# Patient Record
Sex: MALE | Race: WHITE | Employment: FULL TIME | URBAN - METROPOLITAN AREA
[De-identification: names, ages, dates, MRNs, and addresses within clinical notes are randomized per-mention and may not be internally consistent; named-entity substitution may affect disease eponyms.]

---

## 2017-04-28 ENCOUNTER — OFFICE VISIT (OUTPATIENT)
Dept: NEUROLOGY | Age: 32
End: 2017-04-28

## 2017-04-28 VITALS
BODY MASS INDEX: 23.01 KG/M2 | HEART RATE: 60 BPM | OXYGEN SATURATION: 98 % | WEIGHT: 151.8 LBS | SYSTOLIC BLOOD PRESSURE: 104 MMHG | HEIGHT: 68 IN | DIASTOLIC BLOOD PRESSURE: 70 MMHG

## 2017-04-28 DIAGNOSIS — G60.0 CMT (CHARCOT-MARIE-TOOTH DISEASE): Primary | ICD-10-CM

## 2017-04-28 NOTE — MR AVS SNAPSHOT
Visit Information Date & Time Provider Department Dept. Phone Encounter #  
 4/28/2017  8:00 AM Eliza Jung MD [facility]rology [facility] at [facility] [phone] [id number] Your Appointments 4/27/2018  8:40 AM  
Follow Up with Eliza Jung MD  
[facility]) Appt Note: Follow up $0CP tdb 4/28/17  
 [address] [place] [address], 
[address] [facility], [address] [phone]  
[address] [facility], [address] [facility], [address] [address] Upcoming Health Maintenance Date Due DTaP/Tdap/Td series (1 - Tdap) 1/20/2005 INFLUENZA AGE 9 TO ADULT 8/1/2016 Allergies as of 4/28/2017  Review Complete On: 4/28/2017 By: Eliza Jung MD  
 Not on File Current Immunizations  Never Reviewed No immunizations on file. Not reviewed this visit You Were Diagnosed With   
  
 Codes Comments CMT (Charcot-Flower-Tooth disease)    -  Primary ICD-10-CM: G60.0 ICD-9-CM: 356.1 Vitals BP Pulse Height(growth percentile) Weight(growth percentile) SpO2 BMI  
 104/70 60 5' 8\" (1.727 m) 151 lb 12.8 oz (68.9 kg) 98% 23.08 kg/m2 Smoking Status Never Smoker BMI and BSA Data Body Mass Index Body Surface Area 23.08 kg/m 2 1.82 m 2 Preferred Pharmacy Pharmacy Name Phone [facility] #[id number] Martha Rodriguez, [address] [facility] 2 [phone] Your Updated Medication List  
  
   
This list is accurate as of: 4/28/17  8:58 AM.  Always use your most recent med list.  
  
  
  
  
 [employer] Take  by mouth. We Performed the Following RIRI COMPREHENSIVE PLUS PANEL [[id number] Custom] ANGIOTENSIN CONVERTING ENZYME [id number] CPT(R)] CBC WITH AUTOMATED DIFF [81508 CPT(R)] CRP, HIGH SENSITIVITY [78813 CPT(R)] HEMOGLOBIN A1C W/O EAG [04958 CPT(R)] METABOLIC PANEL, COMPREHENSIVE [01969 CPT(R)] METHYLMALONIC ACID [43044 CPT(R)] SED RATE (ESR) [id number] CPT(R)] SPEP AND ANDREAS, SERUM [[id number] Custom] TSH AND FREE T4 [93009 CPT(R)] VITAMIN B12 [id number] CPT(R)] Patient Instructions 1. Blood work today 2. Follow-up in 1 year A Healthy Lifestyle: Care Instructions Your Care Instructions A healthy lifestyle can help you feel good, stay at a healthy weight, and have plenty of energy for both work and play. A healthy lifestyle is something you can share with your whole family. A healthy lifestyle also can lower your risk for serious health problems, such as high blood pressure, heart disease, and diabetes. You can follow a few steps listed below to improve your health and the health of your family. Follow-up care is a key part of your treatment and safety. Be sure to make and go to all appointments, and call your doctor if you are having problems. Its also a good idea to know your test results and keep a list of the medicines you take. How can you care for yourself at home? · Do not eat too much sugar, fat, or fast foods. You can still have dessert and treats now and then. The goal is moderation. · Start small to improve your eating habits. Pay attention to portion sizes, drink less juice and soda pop, and eat more fruits and vegetables. ¨ Eat a healthy amount of food. A 3-ounce serving of meat, for example, is about the size of a deck of cards. Fill the rest of your plate with vegetables and whole grains. ¨ Limit the amount of soda and sports drinks you have every day. Drink more water when you are thirsty. ¨ Eat at least 5 servings of fruits and vegetables every day. It may seem like a lot, but it is not hard to reach this goal. A serving or helping is 1 piece of fruit, 1 cup of vegetables, or 2 cups of leafy, raw vegetables.  Have an apple or some carrot sticks as an afternoon snack instead of a candy bar. Try to have fruits and/or vegetables at every meal. 
· Make exercise part of your daily routine. You may want to start with simple activities, such as walking, bicycling, or slow swimming. Try to be active 30 to 60 minutes every day. You do not need to do all 30 to 60 minutes all at once. For example, you can exercise 3 times a day for 10 or 20 minutes. Moderate exercise is safe for most people, but it is always a good idea to talk to your doctor before starting an exercise program. 
· Keep moving. Teresa Petersony the lawn, work in the garden, or Enplug. Take the stairs instead of the elevator at work. · If you smoke, quit. People who smoke have an increased risk for heart attack, stroke, cancer, and other lung illnesses. Quitting is hard, but there are ways to boost your chance of quitting tobacco for good. ¨ Use nicotine gum, patches, or lozenges. ¨ Ask your doctor about stop-smoking programs and medicines. ¨ Keep trying. In addition to reducing your risk of diseases in the future, you will notice some benefits soon after you stop using tobacco. If you have shortness of breath or asthma symptoms, they will likely get better within a few weeks after you quit. · Limit how much alcohol you drink. Moderate amounts of alcohol (up to 2 drinks a day for men, 1 drink a day for women) are okay. But drinking too much can lead to liver problems, high blood pressure, and other health problems. Family health If you have a family, there are many things you can do together to improve your health. · Eat meals together as a family as often as possible. · Eat healthy foods. This includes fruits, vegetables, lean meats and dairy, and whole grains. · Include your family in your fitness plan. Most people think of activities such as jogging or tennis as the way to fitness, but there are many ways you and your family can be more active.  Anything that makes you breathe hard and gets your heart pumping is exercise. Here are some tips: 
¨ Walk to do errands or to take your child to school or the bus. ¨ Go for a family bike ride after dinner instead of watching TV. Where can you learn more? Go to http://tatyana-alem.info/. Enter C594 in the search box to learn more about \"A Healthy Lifestyle: Care Instructions. \" Current as of: July 26, 2016 Content Version: 11.2 © 2240-4987 [employer]. Care instructions adapted under license by [employer] (which disclaims liability or warranty for this information). If you have questions about a medical condition or this instruction, always ask your healthcare professional. [address] any warranty or liability for your use of this information. Patient Instructions History Introducing [facility]! Rossana Moreira introduces Cribspot patient portal. Now you can access parts of your medical record, email your doctor's office, and request medication refills online. 1. In your internet browser, go to [email or web address] 2. Click on the First Time User? Click Here link in the Sign In box. You will see the New Member Sign Up page. 3. Enter your Cribspot Access Code exactly as it appears below. You will not need to use this code after youve completed the sign-up process. If you do not sign up before the expiration date, you must request a new code. · Cribspot Access Code: [email or web address] Expires: 7/27/2017  8:03 AM 
 
4. Enter the last four digits of your Social Security Number (xxxx) and Date of Birth (mm/dd/yyyy) as indicated and click Submit. You will be taken to the next sign-up page. 5. Create a Cribspot ID. This will be your Cribspot login ID and cannot be changed, so think of one that is secure and easy to remember. 6. Create a Schoologyt password. You can change your password at any time. 7. Enter your Password Reset Question and Answer. This can be used at a later time if you forget your password. 8. Enter your e-mail address. You will receive e-mail notification when new information is available in [address] 9. Click Sign Up. You can now view and download portions of your medical record. 10. Click the Download Summary menu link to download a portable copy of your medical information. If you have questions, please visit the Frequently Asked Questions section of the Woldme website. Remember, Woldme is NOT to be used for urgent needs. For medical emergencies, dial 911. Now available from your iPhone and Android! Please provide this summary of care documentation to your next provider. If you have any questions after today's visit, please call [phone].

## 2017-04-28 NOTE — PATIENT INSTRUCTIONS
1.  Blood work today  2. Follow-up in 1 year      A Healthy Lifestyle: Care Instructions  Your Care Instructions  A healthy lifestyle can help you feel good, stay at a healthy weight, and have plenty of energy for both work and play. A healthy lifestyle is something you can share with your whole family. A healthy lifestyle also can lower your risk for serious health problems, such as high blood pressure, heart disease, and diabetes. You can follow a few steps listed below to improve your health and the health of your family. Follow-up care is a key part of your treatment and safety. Be sure to make and go to all appointments, and call your doctor if you are having problems. Its also a good idea to know your test results and keep a list of the medicines you take. How can you care for yourself at home? · Do not eat too much sugar, fat, or fast foods. You can still have dessert and treats now and then. The goal is moderation. · Start small to improve your eating habits. Pay attention to portion sizes, drink less juice and soda pop, and eat more fruits and vegetables. ¨ Eat a healthy amount of food. A 3-ounce serving of meat, for example, is about the size of a deck of cards. Fill the rest of your plate with vegetables and whole grains. ¨ Limit the amount of soda and sports drinks you have every day. Drink more water when you are thirsty. ¨ Eat at least 5 servings of fruits and vegetables every day. It may seem like a lot, but it is not hard to reach this goal. A serving or helping is 1 piece of fruit, 1 cup of vegetables, or 2 cups of leafy, raw vegetables. Have an apple or some carrot sticks as an afternoon snack instead of a candy bar. Try to have fruits and/or vegetables at every meal.  · Make exercise part of your daily routine. You may want to start with simple activities, such as walking, bicycling, or slow swimming. Try to be active 30 to 60 minutes every day.  You do not need to do all 30 to 60 minutes all at once. For example, you can exercise 3 times a day for 10 or 20 minutes. Moderate exercise is safe for most people, but it is always a good idea to talk to your doctor before starting an exercise program.  · Keep moving. Yadira Gold the lawn, work in the garden, or Connect[employer]ity. Take the stairs instead of the elevator at work. · If you smoke, quit. People who smoke have an increased risk for heart attack, stroke, cancer, and other lung illnesses. Quitting is hard, but there are ways to boost your chance of quitting tobacco for good. ¨ Use nicotine gum, patches, or lozenges. ¨ Ask your doctor about stop-smoking programs and medicines. ¨ Keep trying. In addition to reducing your risk of diseases in the future, you will notice some benefits soon after you stop using tobacco. If you have shortness of breath or asthma symptoms, they will likely get better within a few weeks after you quit. · Limit how much alcohol you drink. Moderate amounts of alcohol (up to 2 drinks a day for men, 1 drink a day for women) are okay. But drinking too much can lead to liver problems, high blood pressure, and other health problems. Family health  If you have a family, there are many things you can do together to improve your health. · Eat meals together as a family as often as possible. · Eat healthy foods. This includes fruits, vegetables, lean meats and dairy, and whole grains. · Include your family in your fitness plan. Most people think of activities such as jogging or tennis as the way to fitness, but there are many ways you and your family can be more active. Anything that makes you breathe hard and gets your heart pumping is exercise. Here are some tips:  ¨ Walk to do errands or to take your child to school or the bus. ¨ Go for a family bike ride after dinner instead of watching TV. Where can you learn more? Go to http://tatyana-alem.info/.   Enter Q495 in the search box to learn more about \"A Healthy Lifestyle: Care Instructions. \"  Current as of: July 26, 2016  Content Version: 11.2  © 7533-5493 Float: [place]. Care instructions adapted under license by [employer] (which disclaims liability or warranty for this information). If you have questions about a medical condition or this instruction, always ask your healthcare professional. [address] any warranty or liability for your use of this information.

## 2017-04-28 NOTE — PROGRESS NOTES
NEUROLOGY NEW PATIENT CONSULATION  REFERRED BY:  No primary care provider on file. 17    Chief Complaint   Patient presents with    New Patient     General Concerns with family /left foot weakness       HISTORY OF PRESENT ILLNESS  Kam Iniguez is a 34 y.o. male who presented to the neurology office for management of Charcot-Flower-Tooth. 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 He was always told that his walking was funny. He did have an EMG/nerve conduction study at that time. In  he did run a marathon and did have fracture of his left tibia and was in a boot for around 8 months. Since that time his left big toe does not have the strength. He cannot pick it up after workup. He denies any numbness or tingling. He has not had any falls in he is able to his daily activity. His symptoms are mild to moderate with no modifying factors. His father, grandmom and great grandfather had Charcot-Flower-Tooth disease as well. His grandmom is still alive and is [de-identified]years old. She uses a cane to walk. She does have problems with circulation in the legs and does not have any diabetes. His dad  at the age of 61 because of heart attack. Current Outpatient Prescriptions   Medication Sig    FINASTERIDE (PROPECIA PO) Take  by mouth. No current facility-administered medications for this visit. Not on File  Past Medical History:   Diagnosis Date    CMT (Charcot-Flower-Tooth disease)     Crohn disease ([facility] [address]     Developmental delay      History reviewed. No pertinent surgical history.   Family History   Problem Relation Age of Onset    Parkinsonism Paternal Uncle     Stroke Maternal Grandmother      Social History   Substance Use Topics    Smoking status: Never Smoker    Smokeless tobacco: None    Alcohol use None       REVIEW OF SYSTEMS:   A ten system review of constitutional, cardiovascular, respiratory, musculoskeletal, endocrine, skin, [address], genitourinary, psychiatric and neurologic systems was obtained and is unremarkable with the exception of muscle weakness     EXAMINATION:   Visit Vitals    /70    Pulse 60    Ht 5' 8\" (1.727 m)    Wt 151 lb 12.8 oz (68.9 kg)    SpO2 98%    BMI 23.08 kg/m2        General:   General appearance: Pt is in no acute distress   Distal pulses are preserved  Fundoscopic Exam: Normal    Neurological Examination:   Mental Status: AAO x3. Speech is fluent. Follows commands, has normal fund of knowledge, attention, short term recall, comprehension and insight. Cranial Nerves: Visual fields are full. PERRL, Extraocular movements are full. Facial sensation intact V1- V3. Facial movement intact, symmetric. Hearing intact to conversation. Palate elevates symmetrically. Shoulder shrug symmetric. Tongue midline. Motor: Strength 2 out of 5 in dorsiflexion of the feet and 0 out of 5 on big toe extension bilaterally. He does have atrophy of calf muscles bilaterally in his feet    Tone: Normal    Sensation: Decreased pinprick sensation distally in the upper and lower extremities    Reflexes: DTRs absent throughout. Plantar responses downgoing. Coordination/Cerebellar: Intact to finger-nose-finger     Gait: High steppage gait bilaterally    Skin: No significant bruising or lacerations. Laboratory review:   No results found for this or any previous visit. Imaging review:  None    Documentation review:  None    Assessment/Plan:   Cady Al is a 34 y.o. male who presented to the neurology office for management of Charcot-Flower-Tooth disease. He was diagnosed with this at the age of 10. He does have numbness distally in his upper and lower extremities on examination along with weakness in bilateral foot dorsiflexion. On walking he does have a high steppage gait. Overall he is doing pretty well. I did discuss with him extensively about Charcot-Flower-Tooth disease and the gradual progression.   I did tell him to avoid alcohol and will be doing blood work to check if there is any underlying factors that might be promoting his nerve damage. Follow-up in 1 year. Over 45 minutes was spent with the patient and family of which > 50% of the visit was spent counseling on diagnosis, management, and treatment of the diagnosis          ICD-10-CM ICD-9-CM    1. CMT (Charcot-Flower-Tooth disease) G60.0 356.1 VITAMIN B12      TSH AND FREE T4      SPEP AND ANDREAS, SERUM      SED RATE (ESR)      METHYLMALONIC ACID      HEMOGLOBIN A1C W/O EAG      CRP, HIGH SENSITIVITY      METABOLIC PANEL, COMPREHENSIVE      CBC WITH AUTOMATED DIFF      RIRI COMPREHENSIVE PLUS PANEL      ANGIOTENSIN CONVERTING ENZYME      Thank you for allowing me to participate in the care of Mr. Deloris Bobby. Please feel free to contact me if you have any questions. Efrain Byrne MD  Diplomate, American Board of Psychiatry & Neurology (Neurology)  Daija Abdi Board of Psychiatry & Neurology (Clinical Neurophysiology)    CC: No primary care provider on file. Fax: None    This note was created using voice recognition software. Despite editing, there may be syntax errors. This note will not be viewable in [address]

## 2019-12-11 ENCOUNTER — HOSPITAL ENCOUNTER (OUTPATIENT)
Dept: ULTRASOUND IMAGING | Age: 34
Discharge: HOME | End: 2019-12-11
Attending: INTERNAL MEDICINE
Payer: COMMERCIAL

## 2019-12-11 DIAGNOSIS — K50.80 CROHN'S DISEASE OF SMALL AND LARGE INTESTINES (HCC): ICD-10-CM

## 2019-12-11 PROCEDURE — 76700 US EXAM ABDOM COMPLETE: CPT

## 2023-05-23 ENCOUNTER — TELEPHONE (OUTPATIENT)
Facility: HOSPITAL | Age: 38
End: 2023-05-23

## 2023-05-23 NOTE — TELEPHONE ENCOUNTER
Today I called patient to give instructions for upcoming MRI with enterography, which is scheduled for 5/26/23. I explained that patient will need to be NPO x 4-6 hours the morning of the MRE, the patient will need to drink Breeza oral solution upon arriving for the scan, and the patient will also be given IV contrast during the scan. . Patient agreed to arrive to register patient in [facility] at [address], and I asked that patient arrive dressed in clothing without metal, with short or loose sleeves, and without jewelry. I also informed patient that total encounter time will be approximately two hours. Patient voiced understanding.      Pedrito Hernandez RN  [address] [place] [address]

## 2023-05-26 ENCOUNTER — HOSPITAL ENCOUNTER (OUTPATIENT)
Facility: HOSPITAL | Age: 38
Discharge: HOME | End: 2023-05-26
Payer: COMMERCIAL

## 2023-05-26 VITALS — WEIGHT: 146 LBS

## 2023-05-26 DIAGNOSIS — Z87.19 HISTORY OF CROHN'S DISEASE: ICD-10-CM

## 2023-05-26 DIAGNOSIS — D84.821 IMMUNODEFICIENCY DUE TO LONG TERM DRUG THERAPY (HCC): ICD-10-CM

## 2023-05-26 DIAGNOSIS — Z79.899 IMMUNODEFICIENCY DUE TO LONG TERM DRUG THERAPY (HCC): ICD-10-CM

## 2023-05-26 PROCEDURE — 6360000002 HC RX W HCPCS

## 2023-05-26 PROCEDURE — 2580000003 HC RX 258: Performed by: INTERNAL MEDICINE

## 2023-05-26 PROCEDURE — 6360000004 HC RX CONTRAST MEDICATION

## 2023-05-26 PROCEDURE — A9579 GAD-BASE MR CONTRAST NOS,1ML: HCPCS

## 2023-05-26 PROCEDURE — 72197 MRI PELVIS W/O & W/DYE: CPT

## 2023-05-26 RX ORDER — 0.9 % SODIUM CHLORIDE 0.9 %
100 INTRAVENOUS SOLUTION INTRAVENOUS ONCE
Status: COMPLETED | OUTPATIENT
Start: 2023-05-26 | End: 2023-05-26

## 2023-05-26 RX ADMIN — GLUCAGON 0.4 MG: KIT at 11:19

## 2023-05-26 RX ADMIN — SODIUM CHLORIDE 100 ML: 900 INJECTION, SOLUTION INTRAVENOUS at 11:26

## 2023-05-26 RX ADMIN — GADOTERIDOL 14 ML: 279.3 INJECTION, SOLUTION INTRAVENOUS at 11:25

## 2023-05-26 NOTE — PROGRESS NOTES
Patient came to MRI today for outpatient MRI enterography scan. Patient drank 3 bottles (500 ml each) of Breeza oral solution upon arrival. Patient tolerated prep & MRE well, without nausea or other complaints. Patient was informed that s/he was given a medication called Glucagon during the MRI scan, and was advised to return slowly to eating/drinking and to avoid high sugar/starchy foods over the next hour. Patient was also advised to drink plenty of water throughout the day today in order to flush out the Gadolinium IV contrast, and to report significant nausea and/or vomiting to his/her medical provider. Patient voiced understanding of instructions and was pink, alert, and in NAD when s/he ambulated from the MRI Department.      Chuy Almazan RN  [facility]

## 2024-11-08 ENCOUNTER — OFFICE VISIT (OUTPATIENT)
Age: 39
End: 2024-11-08

## 2024-11-08 VITALS
OXYGEN SATURATION: 97 % | DIASTOLIC BLOOD PRESSURE: 84 MMHG | RESPIRATION RATE: 16 BRPM | HEART RATE: 83 BPM | TEMPERATURE: 98.4 F | WEIGHT: 155 LBS | SYSTOLIC BLOOD PRESSURE: 121 MMHG

## 2024-11-08 DIAGNOSIS — Z87.19 HISTORY OF CROHN'S DISEASE: Primary | ICD-10-CM

## 2024-11-08 RX ORDER — INFLIXIMAB-AXXQ 100 MG/10ML
INJECTION, POWDER, LYOPHILIZED, FOR SOLUTION INTRAVENOUS
COMMUNITY

## 2024-11-08 RX ORDER — M-VIT,TX,IRON,MINS/CALC/FOLIC 27MG-0.4MG
1 TABLET ORAL DAILY
COMMUNITY

## 2024-11-08 NOTE — PROGRESS NOTES
2024   Neri Bynum (: 1985) is a 39 y.o. male, New patient, here for evaluation of the following chief complaint(s):  Other (Possible long covid, previous infusion he had a high BP and pulse rate. Now constipation and stomach pain - has crohns)     ASSESSMENT/PLAN:  Below is the assessment and plan developed based on review of pertinent history, physical exam, labs, studies, and medications.  Assessment & Plan  History of Crohn's disease               -Your exam was reassuring for no acute systemic illness.  -Recommend contacting gastroenterologist if there any sudden disruptive changes to your bowel regimen    Please follow-up with your PCP in the next 1 to 2 weeks    Handout given with care instructions  2. OTC for symptom management. Increase fluid intake, ensure adequate nutritional intake.  3. Follow up with PCP as needed.  4. Go to ED with development of any acute symptoms.     Follow up:  No follow-ups on file.  Follow up immediately for any new, worsening or changes or if symptoms are not improving over the next 5-7 days.     SUBJECTIVE/OBJECTIVE:  HPI   Mr. Bynum presents for concerns about occasional constipation and and the possibility that he may have long COVID.  He has never officially been diagnosed with COVID.  He states that sometimes he feels like his heart races over that he feels like his blood pressure is high.  He does follow-up regularly with his PCP, and he does have a gastroenterologist who manages his Crohn's disease.  Otherwise, he denies any headache, fever, chills, shortness of breath, chest pain, or other systemic complaints or concerns at this time.    Other (Possible long covid, previous infusion he had a high BP and pulse rate. Now constipation and stomach pain - has crohns)      No results found for any visits on 24.    No results found for this visit on 24.    Review of Systems    ROS reviewed and negative except as stated in the HPI   Physical

## 2024-11-08 NOTE — PATIENT INSTRUCTIONS
Thank you for visiting [facility]!  -Your exam was reassuring for no acute systemic illness.  -Recommend contacting gastroenterologist if there any sudden disruptive changes to your bowel regimen    Please follow-up with your PCP in the next 1 to 2 weeks

## 2025-03-05 ENCOUNTER — OFFICE VISIT (OUTPATIENT)
Age: 40
End: 2025-03-05

## 2025-03-05 VITALS
RESPIRATION RATE: 16 BRPM | DIASTOLIC BLOOD PRESSURE: 79 MMHG | OXYGEN SATURATION: 96 % | SYSTOLIC BLOOD PRESSURE: 113 MMHG | WEIGHT: 159 LBS | HEART RATE: 75 BPM | TEMPERATURE: 98.2 F

## 2025-03-05 DIAGNOSIS — B30.9 VIRAL CONJUNCTIVITIS: Primary | ICD-10-CM

## 2025-03-05 NOTE — PROGRESS NOTES
TO GO TO ER IF SYMPTOMS WORSEN , CHANGE OR FAILS TO IMPROVE.    I have discussed the diagnosis with the patient and the intended plan as seen in the above orders.  The patient has received an after-visit summary and questions were answered concerning future plans.  I have discussed medication side effects and warnings with the patient as well. The patient agrees and understands above plan.       An electronic signature was used to authenticate this note.  -- Fab Decker MD

## 2025-03-05 NOTE — PATIENT INSTRUCTIONS
Exam and history consistent with viral conjunctivitis.  -Artificial tears may help with symptoms  -Please wash your hands thoroughly after contacting the eyes or any tears   -Increase fluid intake to maintain hydration and to help fight infection. Please drink at least 64 ounces of fluid a day  -Ibuprofen 600 mg every 6 hours as needed and Tylenol 500 mg every 6 hours as needed for fever/pain  -Mucinex Fastmax, Tylenol severe cold and flu, or DayQuil for symptomatic relief and decongestion  -1 tablespoon of honey or mixed with hot tea for help with cough.  Recommend taking 30 minutes before sleep to help with cough at night  -Steam inhalation, warm compresses, humidifier, and warm soup can also help  -Please follow-up with your PCP in the next 2 to 4 weeks    If symptoms persist or worsen, please contact PCP and/or return to urgent care.